# Patient Record
Sex: MALE
[De-identification: names, ages, dates, MRNs, and addresses within clinical notes are randomized per-mention and may not be internally consistent; named-entity substitution may affect disease eponyms.]

---

## 2022-05-04 ENCOUNTER — HOSPITAL ENCOUNTER (OUTPATIENT)
Dept: HOSPITAL 97 - OR | Age: 32
Discharge: HOME | End: 2022-05-04
Attending: SURGERY
Payer: COMMERCIAL

## 2022-05-04 VITALS — SYSTOLIC BLOOD PRESSURE: 122 MMHG | DIASTOLIC BLOOD PRESSURE: 74 MMHG | OXYGEN SATURATION: 98 % | TEMPERATURE: 97 F

## 2022-05-04 DIAGNOSIS — E66.9: ICD-10-CM

## 2022-05-04 DIAGNOSIS — A63.0: Primary | ICD-10-CM

## 2022-05-04 DIAGNOSIS — Z20.822: ICD-10-CM

## 2022-05-04 LAB
BUN BLD-MCNC: 12 MG/DL (ref 7–18)
GLUCOSE SERPLBLD-MCNC: 98 MG/DL (ref 74–106)
HCT VFR BLD CALC: 43.7 % (ref 39.6–49)
LYMPHOCYTES # SPEC AUTO: 2.2 K/UL (ref 0.7–4.9)
PMV BLD: 7.4 FL (ref 7.6–11.3)
POTASSIUM SERPL-SCNC: 4.1 MMOL/L (ref 3.5–5.1)
RBC # BLD: 5.25 M/UL (ref 4.33–5.43)

## 2022-05-04 PROCEDURE — 88305 TISSUE EXAM BY PATHOLOGIST: CPT

## 2022-05-04 PROCEDURE — 0JBC0ZZ EXCISION OF PELVIC REGION SUBCUTANEOUS TISSUE AND FASCIA, OPEN APPROACH: ICD-10-PCS

## 2022-05-04 PROCEDURE — 36415 COLL VENOUS BLD VENIPUNCTURE: CPT

## 2022-05-04 PROCEDURE — 49205: CPT

## 2022-05-04 PROCEDURE — 85025 COMPLETE CBC W/AUTO DIFF WBC: CPT

## 2022-05-04 PROCEDURE — 80048 BASIC METABOLIC PNL TOTAL CA: CPT

## 2022-05-04 NOTE — OP
Date of Procedure:  05/04/2022



Surgeon:  Antwon Doe MD



Assistant:  Sada Ramey.



Preoperative Diagnosis:  Ulcerated friable mass, subcutaneous mass, right groin.



Postoperative Diagnosis:  Wide excision of ulcerated friable subcutaneous mass, right groin.



Procedures:  Wide excision of ulcerated friable polypoid subcutaneous mass 15 x 15 x 10 cm



Estimated Blood Loss:  Less than 10 cc.



Specimen:  Mass.



Finding:  The patient had this large grapefruit size mass in the groin area.  It started from the sub
cutaneous tissue, going through the skin, friable ulcerated.  It does have the appearance of maybe a 
condyloma component or may be also a malignancy.  The patient wants this excised.  The benefits, alte
rnatives, and risks of excision fully explained which include, but not limited to infection, bleeding
, damage to adjacent structures, anesthesia complication, recurrence, MI, and death.  He also underst
ands this may not relieve the symptoms.  He might need a surgical intervention.  He understands he wi
ll require wound care.  He is willing to do that with his family.  He signed the consent.  The area o
f concern was marked by me and the patient in the holding room.



Procedure In Detail:  The patient was brought to the operating room, placed in supine position.  Anes
thesia was done without complication.  Abdominal and groin areas were prepped and draped in sterile f
ashion.  The patient had this large fungating mass, the size of a grapefruit, fluid coming from the d
eep subcutaneous tissue, so we made an incision wedge in the skin and then went deep to subcutaneous 
tissue.  We did not see the mass anymore and then proceeded to remove that.  The mass was completely 
excised.  Hemostasis was obtained with 0 silk sutures, Bovie cauterizer.  The area was irrigated.  He
mostasis obtained once again.  Local anesthesia was applied and then the area was covered with dry dr
essing.  Due to the ulceration and the lot of discharge from this area, I do not believe it will be s
afe to close this.  At this moment, we are going let it close by secondary intention.  We will follow
 with dressing changes.  The patient tolerated the procedure well.  The patient sent to recovery in s
table condition.



Disposition:  To home.



Activity:  As tolerated.  No heavy lifting. 



He is going to follow up in the next 48 hours in my office, when we going to show him how to do dress
ing changes, normal saline.





HM/MODL

DD:  05/04/2022 11:51:48Voice ID:  [id number]

DT:  05/04/2022 19:35:13Report ID:  [id number]

## 2022-05-04 NOTE — P.BOP
Preoperative diagnosis: Right groin Ulcerated friable polypoid subQ and skin 

mass


Postoperative diagnosis: same


Primary procedure: Wide excision R groin Ulcerated friable polypoid subQ mass  

[id number]


Assistant: WOLF SIN (RNFA)


Estimated blood loss: <10cc


Specimen: mass


Findings: large mass


Anesthesia: General


Complications: None


Drain(s): Other (wet to dry)


Fluids & blood products: no blood


Transferred to: Recovery Room


Condition: Good